# Patient Record
Sex: FEMALE | Race: WHITE | NOT HISPANIC OR LATINO | URBAN - METROPOLITAN AREA
[De-identification: names, ages, dates, MRNs, and addresses within clinical notes are randomized per-mention and may not be internally consistent; named-entity substitution may affect disease eponyms.]

---

## 2025-02-24 ENCOUNTER — OFFICE VISIT (OUTPATIENT)
Dept: URGENT CARE | Age: 70
End: 2025-02-24
Payer: MEDICARE

## 2025-02-24 VITALS
SYSTOLIC BLOOD PRESSURE: 188 MMHG | TEMPERATURE: 97.9 F | DIASTOLIC BLOOD PRESSURE: 98 MMHG | RESPIRATION RATE: 18 BRPM | OXYGEN SATURATION: 88 % | HEART RATE: 106 BPM | BODY MASS INDEX: 44.29 KG/M2 | WEIGHT: 250 LBS

## 2025-02-24 DIAGNOSIS — R06.2 WHEEZING: ICD-10-CM

## 2025-02-24 DIAGNOSIS — J40 BRONCHITIS: Primary | ICD-10-CM

## 2025-02-24 RX ORDER — IPRATROPIUM BROMIDE AND ALBUTEROL SULFATE 2.5; .5 MG/3ML; MG/3ML
3 SOLUTION RESPIRATORY (INHALATION) ONCE
Status: COMPLETED | OUTPATIENT
Start: 2025-02-24 | End: 2025-02-24

## 2025-02-24 RX ORDER — METHYLPREDNISOLONE 4 MG/1
TABLET ORAL
Qty: 21 TABLET | Refills: 0 | Status: SHIPPED | OUTPATIENT
Start: 2025-02-24

## 2025-02-24 RX ORDER — AZITHROMYCIN 250 MG/1
TABLET, FILM COATED ORAL
Qty: 6 TABLET | Refills: 0 | Status: SHIPPED | OUTPATIENT
Start: 2025-02-24 | End: 2025-02-24

## 2025-02-24 RX ORDER — METHYLPREDNISOLONE 4 MG/1
TABLET ORAL
Qty: 21 TABLET | Refills: 0 | Status: SHIPPED | OUTPATIENT
Start: 2025-02-24 | End: 2025-02-24

## 2025-02-24 RX ORDER — AZITHROMYCIN 250 MG/1
TABLET, FILM COATED ORAL
Qty: 6 TABLET | Refills: 0 | Status: SHIPPED | OUTPATIENT
Start: 2025-02-24 | End: 2025-03-01

## 2025-02-24 RX ADMIN — IPRATROPIUM BROMIDE AND ALBUTEROL SULFATE 3 ML: 2.5; .5 SOLUTION RESPIRATORY (INHALATION) at 10:15

## 2025-02-24 ASSESSMENT — ENCOUNTER SYMPTOMS
LOSS OF SENSATION IN FEET: 0
DEPRESSION: 0
OCCASIONAL FEELINGS OF UNSTEADINESS: 0

## 2025-02-24 NOTE — PROGRESS NOTES
"Subjective   Patient ID: Rhina Still \"Gómez" is a 69 y.o. female. They present today with a chief complaint of Cough (C/o cough and wheezing since Friday.).    History of Present Illness  70 yo female coming in for cough and wheezing since Friday. She states she has not had any fevers or chills. She denies any shortness of breath. She denies any congestion.     Past Medical History  Allergies as of 02/24/2025 - Reviewed 02/24/2025   Allergen Reaction Noted    Tetracyclines Other, Hives, and Swelling 06/17/2018       (Not in a hospital admission)       Past Medical History:   Diagnosis Date    Personal history of nicotine dependence 07/11/2020    History of nicotine dependence       Past Surgical History:   Procedure Laterality Date    ANKLE SURGERY  04/28/2015    Ankle Surgery    CHOLECYSTECTOMY  04/28/2015    Cholecystectomy        reports that she has never smoked. She has never used smokeless tobacco. She reports that she does not drink alcohol and does not use drugs.    Review of Systems  Review of Systems:  General: No weight loss, fatigue, anorexia, insomnia, fever, chills.  ENT: No pharyngitis, dry mouth, nasal congestion, ear pain  Cardiac: No chest pain, palpitations, syncope, near syncope.  Pulmonary:  No shortness of breath, positive cough and wheezing, no hemoptysis  Heme/lymph: No swollen glands, fever, bleeding  Musculoskeletal: No limb pain, joint pain, joint swelling.  Skin: No rashes  Neuro: No numbness, tingling, headaches                                 Objective    Vitals:    02/24/25 0908 02/24/25 0916   BP: (!) 182/95 (!) 188/98   BP Location: Left arm Right arm   Patient Position: Sitting Sitting   BP Cuff Size: Large adult Large adult   Pulse: 105 106   Resp: 18 18   Temp: 36.6 °C (97.9 °F)    TempSrc: Oral    SpO2: 91% (!) 88%   Weight: 113 kg (250 lb)      No LMP recorded.    Physical Exam  Physical Exam:  General: Vital noted, no distress. Afebrile  Cardiac: Regular rate and " rhythm, no murmur  Pulmonary: Lungs with expiratory wheezing bilaterally with good aeration. No adventitious breath sounds.  Skin: No rashes        Procedures    Point of Care Test & Imaging Results from this visit  No results found for this visit on 02/24/25.   No results found.    Diagnostic study results (if any) were reviewed by SHANDRA Vale.    Assessment/Plan   Allergies, medications, history, and pertinent labs/EKGs/Imaging reviewed by SHANDRA Vale.     Medical Decision Making  Treatment: duoneb given in clinic. Pulse ox improved to 94% and wheezing resolved. Zithromax and medrol dose pack prescribed  Differential: 1) bronchitis , 2) uri , 3) pneumonia  Plan: Patient will follow up with the PCP in the next 2-3 days. Return for any worsening symptoms or go to the ER for further evaluation. Patient understands return precautions and discharge insturctions.  Impression:   1) bronchitis      Orders and Diagnoses  Diagnoses and all orders for this visit:  Bronchitis  -     azithromycin (Zithromax) 250 mg tablet; Take 2 tabs (500 mg) by mouth today, than 1 daily for 4 days.  -     methylPREDNISolone (Medrol Dospak) 4 mg tablets; Follow schedule on package instructions  Wheezing  -     ipratropium-albuteroL (Duo-Neb) 0.5-2.5 mg/3 mL nebulizer solution 3 mL      Medical Admin Record  Administrations This Visit       ipratropium-albuteroL (Duo-Neb) 0.5-2.5 mg/3 mL nebulizer solution 3 mL       Admin Date  02/24/2025 Action  Given Dose  3 mL Route  nebulization Documented By  Onel Pinzon MA                    Patient disposition: Home    Electronically signed by SHANDRA Vale  10:42 AM

## 2025-08-06 ENCOUNTER — OFFICE VISIT (OUTPATIENT)
Dept: URGENT CARE | Age: 70
End: 2025-08-06
Payer: MEDICARE

## 2025-08-06 VITALS
WEIGHT: 250 LBS | BODY MASS INDEX: 44.29 KG/M2 | RESPIRATION RATE: 18 BRPM | DIASTOLIC BLOOD PRESSURE: 81 MMHG | HEART RATE: 105 BPM | OXYGEN SATURATION: 88 % | SYSTOLIC BLOOD PRESSURE: 156 MMHG | TEMPERATURE: 98.3 F

## 2025-08-06 DIAGNOSIS — J44.0 BRONCHITIS, CHRONIC OBSTRUCTIVE W ACUTE BRONCHITIS (MULTI): ICD-10-CM

## 2025-08-06 DIAGNOSIS — R09.02 HYPOXIA: Primary | ICD-10-CM

## 2025-08-06 DIAGNOSIS — J20.9 BRONCHITIS, CHRONIC OBSTRUCTIVE W ACUTE BRONCHITIS (MULTI): ICD-10-CM

## 2025-08-06 PROBLEM — R73.09 ABNORMAL GLUCOSE: Status: ACTIVE | Noted: 2025-08-06

## 2025-08-06 PROBLEM — J44.1 ACUTE EXACERBATION OF CHRONIC OBSTRUCTIVE PULMONARY DISEASE (MULTI): Status: ACTIVE | Noted: 2025-08-06

## 2025-08-06 PROBLEM — E11.9 TYPE 2 DIABETES MELLITUS: Status: ACTIVE | Noted: 2025-08-06

## 2025-08-06 PROBLEM — I10 HYPERTENSION: Status: ACTIVE | Noted: 2025-08-06

## 2025-08-06 LAB
POC CORONAVIRUS SARS-COV-2 PCR: NEGATIVE
POC HUMAN RHINOVIRUS PCR: NEGATIVE
POC INFLUENZA A VIRUS PCR: NEGATIVE
POC INFLUENZA B VIRUS PCR: NEGATIVE
POC RESPIRATORY SYNCYTIAL VIRUS PCR: NEGATIVE

## 2025-08-06 RX ORDER — IPRATROPIUM BROMIDE AND ALBUTEROL SULFATE 2.5; .5 MG/3ML; MG/3ML
3 SOLUTION RESPIRATORY (INHALATION) ONCE
Status: COMPLETED | OUTPATIENT
Start: 2025-08-06 | End: 2025-08-06

## 2025-08-06 RX ORDER — ALBUTEROL SULFATE 90 UG/1
2 INHALANT RESPIRATORY (INHALATION) EVERY 6 HOURS PRN
Qty: 8 G | Refills: 2 | Status: SHIPPED | OUTPATIENT
Start: 2025-08-06

## 2025-08-06 RX ORDER — AMOXICILLIN AND CLAVULANATE POTASSIUM 875; 125 MG/1; MG/1
875 TABLET, FILM COATED ORAL 2 TIMES DAILY
Qty: 20 TABLET | Refills: 0 | Status: SHIPPED | OUTPATIENT
Start: 2025-08-06

## 2025-08-06 RX ORDER — METHYLPREDNISOLONE SODIUM SUCCINATE 125 MG/2ML
125 INJECTION INTRAMUSCULAR; INTRAVENOUS ONCE
Status: COMPLETED | OUTPATIENT
Start: 2025-08-06 | End: 2025-08-06

## 2025-08-06 RX ORDER — PREDNISONE 10 MG/1
TABLET ORAL
Qty: 21 TABLET | Refills: 0 | Status: SHIPPED | OUTPATIENT
Start: 2025-08-06 | End: 2025-08-12

## 2025-08-06 RX ADMIN — METHYLPREDNISOLONE SODIUM SUCCINATE 125 MG: 125 INJECTION INTRAMUSCULAR; INTRAVENOUS at 08:55

## 2025-08-06 RX ADMIN — IPRATROPIUM BROMIDE AND ALBUTEROL SULFATE 3 ML: 2.5; .5 SOLUTION RESPIRATORY (INHALATION) at 08:54

## 2025-08-06 ASSESSMENT — ENCOUNTER SYMPTOMS
SHORTNESS OF BREATH: 1
CONSTITUTIONAL NEGATIVE: 1
COUGH: 1
WHEEZING: 1
PALPITATIONS: 0

## 2025-08-06 ASSESSMENT — PATIENT HEALTH QUESTIONNAIRE - PHQ9
2. FEELING DOWN, DEPRESSED OR HOPELESS: NOT AT ALL
1. LITTLE INTEREST OR PLEASURE IN DOING THINGS: NOT AT ALL
SUM OF ALL RESPONSES TO PHQ9 QUESTIONS 1 AND 2: 0

## 2025-08-06 NOTE — PATIENT INSTRUCTIONS
Continue taking Mucinex-DM .  If you did not improve in a few hours please go to ED.  Take Probiotics and or eat yogurt for 2 weeks   Do not smoke.  PCP follow up in 1-2 days.  Pt. Signed AMA.

## 2025-08-06 NOTE — PROGRESS NOTES
"Subjective   Patient ID: Rhina Still \"Gómez" is a 70 y.o. female. They present today with a chief complaint of Cough (COUGH with wheezing symptoms started Friday. ).    History of Present Illness  HPI    Past Medical History  Allergies as of 08/06/2025 - Reviewed 08/06/2025   Allergen Reaction Noted    Tetracyclines Other, Hives, and Swelling 06/17/2018       Prescriptions Prior to Admission[1]     Medical History[2]    Surgical History[3]     reports that she has been smoking cigarettes. She has never used smokeless tobacco. She reports that she does not drink alcohol and does not use drugs.    Review of Systems  Review of Systems   Constitutional: Negative.    HENT: Negative.     Respiratory:  Positive for cough, shortness of breath and wheezing.    Cardiovascular:  Negative for chest pain and palpitations.                                  Objective    Vitals:    08/06/25 0827   BP: 156/81   Pulse: 105   Resp: 18   Temp: 36.8 °C (98.3 °F)   SpO2: (!) 88%   Weight: 113 kg (250 lb)     No LMP recorded. (Menstrual status: Menopausal).    Physical Exam  Constitutional:       Appearance: Normal appearance.   HENT:      Nose: No congestion.     Cardiovascular:      Rate and Rhythm: Normal rate and regular rhythm.   Pulmonary:      Effort: Pulmonary effort is normal.      Breath sounds: Decreased air movement present. Examination of the right-upper field reveals decreased breath sounds and wheezing. Examination of the left-upper field reveals decreased breath sounds and wheezing. Examination of the right-middle field reveals decreased breath sounds and wheezing. Examination of the left-middle field reveals decreased breath sounds and wheezing. Examination of the right-lower field reveals decreased breath sounds and wheezing. Examination of the left-lower field reveals decreased breath sounds and wheezing. Decreased breath sounds and wheezing present.     Neurological:      Mental Status: She is alert. "         Procedures    Point of Care Test & Imaging Results from this visit  No results found for this visit on 08/06/25.   Imaging  No results found.    Cardiology, Vascular, and Other Imaging  No other imaging results found for the past 2 days      Diagnostic study results (if any) were reviewed by Sarah Coello MD.    Assessment/Plan   Allergies, medications, history, and pertinent labs/EKGs/Imaging reviewed by Sarah Coello MD.     Medical Decision Making  COPD with exacerbation, patient's symptoms improved after Solumedrol and Duaneb treatment, Pulse Ox was 93% at rest,after she walked around our hallways,her Pulse Ox dropped to 85%,and she was tachypnic. I advised patient to go to ED for [place] patient treatment,but she declined. I will treat her with Prednisone, Albuterol and Augmentin    Orders and Diagnoses  There are no diagnoses linked to this encounter.    Medical Admin Record      Patient disposition: Home    Electronically signed by Sarah Coello MD  8:37 AM           [1] (Not in a hospital admission)  [2]   Past Medical History:  Diagnosis Date    Personal history of nicotine dependence 07/11/2020    History of nicotine dependence   [3]   Past Surgical History:  Procedure Laterality Date    ANKLE SURGERY  04/28/2015    Ankle Surgery    CHOLECYSTECTOMY  04/28/2015    Cholecystectomy